# Patient Record
Sex: MALE | Race: WHITE | NOT HISPANIC OR LATINO | Employment: FULL TIME | URBAN - METROPOLITAN AREA
[De-identification: names, ages, dates, MRNs, and addresses within clinical notes are randomized per-mention and may not be internally consistent; named-entity substitution may affect disease eponyms.]

---

## 2023-02-17 PROBLEM — K21.9 GASTROESOPHAGEAL REFLUX DISEASE WITHOUT ESOPHAGITIS: Status: ACTIVE | Noted: 2023-02-17

## 2023-02-17 PROBLEM — J32.0 CHRONIC MAXILLARY SINUSITIS: Status: ACTIVE | Noted: 2023-02-17

## 2023-02-17 PROBLEM — J45.991 COUGH VARIANT ASTHMA (HHS-HCC): Status: ACTIVE | Noted: 2023-02-17

## 2023-02-17 PROBLEM — H02.401 PTOSIS OF RIGHT EYELID: Status: ACTIVE | Noted: 2023-02-17

## 2023-02-17 PROBLEM — R06.3 CHEYNE-STOKES BREATHING: Status: ACTIVE | Noted: 2023-02-17

## 2023-02-17 PROBLEM — G47.00 INSOMNIA: Status: ACTIVE | Noted: 2023-02-17

## 2023-02-17 PROBLEM — R05.2 SUBACUTE COUGH: Status: ACTIVE | Noted: 2023-02-17

## 2023-02-17 PROBLEM — M25.561 ACUTE PAIN OF RIGHT KNEE: Status: ACTIVE | Noted: 2023-02-17

## 2023-02-17 PROBLEM — R00.2 PALPITATIONS: Status: ACTIVE | Noted: 2023-02-17

## 2023-02-17 PROBLEM — J20.9 ACUTE BRONCHITIS, UNSPECIFIED: Status: ACTIVE | Noted: 2023-02-17

## 2023-02-17 PROBLEM — J32.1 CHRONIC FRONTAL SINUSITIS: Status: ACTIVE | Noted: 2023-02-17

## 2023-02-17 PROBLEM — E55.9 VITAMIN D DEFICIENCY: Status: ACTIVE | Noted: 2023-02-17

## 2023-02-17 PROBLEM — J31.0 CHRONIC RHINITIS: Status: ACTIVE | Noted: 2023-02-17

## 2023-02-17 PROBLEM — L25.5 CONTACT DERMATITIS DUE TO PLANT: Status: ACTIVE | Noted: 2023-02-17

## 2023-02-17 PROBLEM — R19.4 CHANGE IN BOWEL HABITS: Status: ACTIVE | Noted: 2023-02-17

## 2023-02-17 PROBLEM — L25.9 CONTACT DERMATITIS: Status: ACTIVE | Noted: 2023-02-17

## 2023-02-17 PROBLEM — L29.0 PRURITUS ANI: Status: ACTIVE | Noted: 2023-02-17

## 2023-02-17 PROBLEM — I72.9 ANEURYSM (CMS-HCC): Status: ACTIVE | Noted: 2023-02-17

## 2023-02-17 RX ORDER — ALBUTEROL SULFATE 90 UG/1
2 AEROSOL, METERED RESPIRATORY (INHALATION)
COMMUNITY
Start: 2019-04-09 | End: 2023-04-05 | Stop reason: ALTCHOICE

## 2023-02-17 RX ORDER — RABEPRAZOLE SODIUM 20 MG/1
20 TABLET, DELAYED RELEASE ORAL DAILY
COMMUNITY
Start: 2018-06-22 | End: 2023-09-05

## 2023-02-17 RX ORDER — FLUTICASONE PROPIONATE 44 UG/1
2 AEROSOL, METERED RESPIRATORY (INHALATION) 2 TIMES DAILY
COMMUNITY
Start: 2021-10-26 | End: 2023-04-05

## 2023-02-17 RX ORDER — AZELASTINE 1 MG/ML
2 SPRAY, METERED NASAL
COMMUNITY
Start: 2020-01-10 | End: 2023-05-18 | Stop reason: SDUPTHER

## 2023-02-17 RX ORDER — CHOLECALCIFEROL (VITAMIN D3) 25 MCG
25 TABLET ORAL DAILY
COMMUNITY
Start: 2015-07-07

## 2023-02-17 RX ORDER — MONTELUKAST SODIUM 10 MG/1
10 TABLET ORAL DAILY
COMMUNITY
Start: 2015-01-21 | End: 2024-02-06
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
## 2023-04-04 ASSESSMENT — PROMIS GLOBAL HEALTH SCALE
RATE_AVERAGE_PAIN: 1
RATE_SOCIAL_SATISFACTION: EXCELLENT
RATE_QUALITY_OF_LIFE: EXCELLENT
CARRYOUT_SOCIAL_ACTIVITIES: EXCELLENT
EMOTIONAL_PROBLEMS: NEVER
RATE_MENTAL_HEALTH: EXCELLENT
RATE_PHYSICAL_HEALTH: VERY GOOD
RATE_AVERAGE_FATIGUE: MILD
CARRYOUT_PHYSICAL_ACTIVITIES: COMPLETELY
RATE_GENERAL_HEALTH: VERY GOOD

## 2023-04-05 ENCOUNTER — OFFICE VISIT (OUTPATIENT)
Dept: PRIMARY CARE | Facility: CLINIC | Age: 63
End: 2023-04-05
Payer: COMMERCIAL

## 2023-04-05 ENCOUNTER — LAB (OUTPATIENT)
Dept: LAB | Facility: LAB | Age: 63
End: 2023-04-05
Payer: COMMERCIAL

## 2023-04-05 VITALS
WEIGHT: 220.38 LBS | SYSTOLIC BLOOD PRESSURE: 110 MMHG | BODY MASS INDEX: 31.85 KG/M2 | HEART RATE: 62 BPM | DIASTOLIC BLOOD PRESSURE: 80 MMHG

## 2023-04-05 DIAGNOSIS — K21.9 GASTROESOPHAGEAL REFLUX DISEASE WITHOUT ESOPHAGITIS: ICD-10-CM

## 2023-04-05 DIAGNOSIS — J31.0 CHRONIC RHINITIS: ICD-10-CM

## 2023-04-05 DIAGNOSIS — I72.9 ANEURYSM (CMS-HCC): ICD-10-CM

## 2023-04-05 DIAGNOSIS — L29.0 PRURITUS ANI: Primary | ICD-10-CM

## 2023-04-05 DIAGNOSIS — J45.991 COUGH VARIANT ASTHMA (HHS-HCC): ICD-10-CM

## 2023-04-05 DIAGNOSIS — E55.9 VITAMIN D DEFICIENCY: ICD-10-CM

## 2023-04-05 DIAGNOSIS — L29.0 PRURITUS ANI: ICD-10-CM

## 2023-04-05 LAB
ALANINE AMINOTRANSFERASE (SGPT) (U/L) IN SER/PLAS: 24 U/L (ref 10–52)
ALBUMIN (G/DL) IN SER/PLAS: 4.2 G/DL (ref 3.4–5)
ALKALINE PHOSPHATASE (U/L) IN SER/PLAS: 62 U/L (ref 33–136)
ANION GAP IN SER/PLAS: 13 MMOL/L (ref 10–20)
ASPARTATE AMINOTRANSFERASE (SGOT) (U/L) IN SER/PLAS: 30 U/L (ref 9–39)
BASOPHILS (10*3/UL) IN BLOOD BY AUTOMATED COUNT: 0.06 X10E9/L (ref 0–0.1)
BASOPHILS/100 LEUKOCYTES IN BLOOD BY AUTOMATED COUNT: 1.2 % (ref 0–2)
BILIRUBIN TOTAL (MG/DL) IN SER/PLAS: 0.9 MG/DL (ref 0–1.2)
C REACTIVE PROTEIN (MG/L) IN SER/PLAS BY HIGH SENSIT: 1.8 MG/L
CALCIDIOL (25 OH VITAMIN D3) (NG/ML) IN SER/PLAS: 32 NG/ML
CALCIUM (MG/DL) IN SER/PLAS: 9 MG/DL (ref 8.6–10.6)
CARBON DIOXIDE, TOTAL (MMOL/L) IN SER/PLAS: 27 MMOL/L (ref 21–32)
CHLORIDE (MMOL/L) IN SER/PLAS: 103 MMOL/L (ref 98–107)
CHOLESTEROL (MG/DL) IN SER/PLAS: 188 MG/DL (ref 0–199)
CHOLESTEROL IN HDL (MG/DL) IN SER/PLAS: 47.6 MG/DL
CHOLESTEROL/HDL RATIO: 3.9
COBALAMIN (VITAMIN B12) (PG/ML) IN SER/PLAS: 285 PG/ML (ref 211–911)
CREATININE (MG/DL) IN SER/PLAS: 1.01 MG/DL (ref 0.5–1.3)
EOSINOPHILS (10*3/UL) IN BLOOD BY AUTOMATED COUNT: 0.15 X10E9/L (ref 0–0.7)
EOSINOPHILS/100 LEUKOCYTES IN BLOOD BY AUTOMATED COUNT: 3 % (ref 0–6)
ERYTHROCYTE DISTRIBUTION WIDTH (RATIO) BY AUTOMATED COUNT: 12.1 % (ref 11.5–14.5)
ERYTHROCYTE MEAN CORPUSCULAR HEMOGLOBIN CONCENTRATION (G/DL) BY AUTOMATED: 34.3 G/DL (ref 32–36)
ERYTHROCYTE MEAN CORPUSCULAR VOLUME (FL) BY AUTOMATED COUNT: 93 FL (ref 80–100)
ERYTHROCYTES (10*6/UL) IN BLOOD BY AUTOMATED COUNT: 4.65 X10E12/L (ref 4.5–5.9)
GFR MALE: 84 ML/MIN/1.73M2
GLUCOSE (MG/DL) IN SER/PLAS: 89 MG/DL (ref 74–99)
HEMATOCRIT (%) IN BLOOD BY AUTOMATED COUNT: 43.4 % (ref 41–52)
HEMOGLOBIN (G/DL) IN BLOOD: 14.9 G/DL (ref 13.5–17.5)
IMMATURE GRANULOCYTES/100 LEUKOCYTES IN BLOOD BY AUTOMATED COUNT: 0.2 % (ref 0–0.9)
LDL: 122 MG/DL (ref 0–99)
LEUKOCYTES (10*3/UL) IN BLOOD BY AUTOMATED COUNT: 4.9 X10E9/L (ref 4.4–11.3)
LYMPHOCYTES (10*3/UL) IN BLOOD BY AUTOMATED COUNT: 1.31 X10E9/L (ref 1.2–4.8)
LYMPHOCYTES/100 LEUKOCYTES IN BLOOD BY AUTOMATED COUNT: 26.6 % (ref 13–44)
MONOCYTES (10*3/UL) IN BLOOD BY AUTOMATED COUNT: 0.54 X10E9/L (ref 0.1–1)
MONOCYTES/100 LEUKOCYTES IN BLOOD BY AUTOMATED COUNT: 11 % (ref 2–10)
NEUTROPHILS (10*3/UL) IN BLOOD BY AUTOMATED COUNT: 2.86 X10E9/L (ref 1.2–7.7)
NEUTROPHILS/100 LEUKOCYTES IN BLOOD BY AUTOMATED COUNT: 58 % (ref 40–80)
NRBC (PER 100 WBCS) BY AUTOMATED COUNT: 0 /100 WBC (ref 0–0)
PLATELETS (10*3/UL) IN BLOOD AUTOMATED COUNT: 247 X10E9/L (ref 150–450)
POTASSIUM (MMOL/L) IN SER/PLAS: 4.4 MMOL/L (ref 3.5–5.3)
PROSTATE SPECIFIC AG (NG/ML) IN SER/PLAS: 0.5 NG/ML (ref 0–4)
PROTEIN TOTAL: 6.3 G/DL (ref 6.4–8.2)
SODIUM (MMOL/L) IN SER/PLAS: 139 MMOL/L (ref 136–145)
THYROTROPIN (MIU/L) IN SER/PLAS BY DETECTION LIMIT <= 0.05 MIU/L: 2.82 MIU/L (ref 0.44–3.98)
TRIGLYCERIDE (MG/DL) IN SER/PLAS: 93 MG/DL (ref 0–149)
UREA NITROGEN (MG/DL) IN SER/PLAS: 15 MG/DL (ref 6–23)
VLDL: 19 MG/DL (ref 0–40)

## 2023-04-05 PROCEDURE — 36415 COLL VENOUS BLD VENIPUNCTURE: CPT

## 2023-04-05 PROCEDURE — 84153 ASSAY OF PSA TOTAL: CPT

## 2023-04-05 PROCEDURE — 86141 C-REACTIVE PROTEIN HS: CPT

## 2023-04-05 PROCEDURE — 84443 ASSAY THYROID STIM HORMONE: CPT

## 2023-04-05 PROCEDURE — 80053 COMPREHEN METABOLIC PANEL: CPT

## 2023-04-05 PROCEDURE — 82607 VITAMIN B-12: CPT

## 2023-04-05 PROCEDURE — 85025 COMPLETE CBC W/AUTO DIFF WBC: CPT

## 2023-04-05 PROCEDURE — 99213 OFFICE O/P EST LOW 20 MIN: CPT | Performed by: INTERNAL MEDICINE

## 2023-04-05 PROCEDURE — 1036F TOBACCO NON-USER: CPT | Performed by: INTERNAL MEDICINE

## 2023-04-05 PROCEDURE — 80061 LIPID PANEL: CPT

## 2023-04-05 PROCEDURE — 82306 VITAMIN D 25 HYDROXY: CPT

## 2023-04-05 NOTE — PATIENT INSTRUCTIONS
Begin Pulmicort 2 puffs daily.  Begin Benadryl 12.5-25 mg p.o. nightly.  Restart Astelin spray 2 sprays to each nostril twice daily as needed.  Call me in 1 month with her condition and return for complete physical examination in 1 year

## 2023-04-07 DIAGNOSIS — J45.991 COUGH VARIANT ASTHMA (HHS-HCC): Primary | ICD-10-CM

## 2023-05-18 DIAGNOSIS — J31.0 CHRONIC RHINITIS: Primary | ICD-10-CM

## 2023-05-18 RX ORDER — AZELASTINE 1 MG/ML
2 SPRAY, METERED NASAL 2 TIMES DAILY
Qty: 30 ML | Refills: 1 | Status: SHIPPED | OUTPATIENT
Start: 2023-05-18

## 2023-08-29 DIAGNOSIS — K21.9 GASTROESOPHAGEAL REFLUX DISEASE WITHOUT ESOPHAGITIS: Primary | ICD-10-CM

## 2023-09-05 RX ORDER — RABEPRAZOLE SODIUM 20 MG/1
20 TABLET, DELAYED RELEASE ORAL DAILY
Qty: 90 TABLET | Refills: 2 | Status: SHIPPED | OUTPATIENT
Start: 2023-09-05

## 2024-02-04 DIAGNOSIS — J45.991 COUGH VARIANT ASTHMA (HHS-HCC): Primary | ICD-10-CM

## 2024-02-06 RX ORDER — MONTELUKAST SODIUM 10 MG/1
10 TABLET ORAL DAILY
Qty: 90 TABLET | Refills: 3 | Status: SHIPPED | OUTPATIENT
Start: 2024-02-06

## 2024-06-16 NOTE — PROGRESS NOTES
Subjective   Patient ID: Ashok Olivera is a 63 y.o. male who presents for cough    HPI   The patient reports continued chronic nonproductive cough which she feels is increased in frequency over the past year.  He does report that the episodes can be precipitated by exercise, exposure to cut grass.  He also reports that the cough can be precipitated by the patient initially lying down, but he also reports that the cough will develop upon rising in the morning.  Over the past year, he still reports continued chronic frequent throat clearing, chronic constant sensation of something being in the back of the throat-not significantly changed.  He reports no other associated symptoms.  The patient has been using rabeprazole 20 mg half hour before dinner and montelukast.  He has not regularly been using Astelin as he did not feel that using Astelin had any effect on the frequency of the chronic cough  Review of Systems    Objective   There were no vitals taken for this visit.    Physical Exam  Head-palpation revealed a non-movable, nontender 5x5mm nodule right occipital region, no increase in warmth.  Palpation revealed no tenderness over the maxillary or frontal sinuses.    Nose - turbinates not erythematous or swollen; nasal septal deviation noted to the left.  Mouth - posterior pharynx is not erythematous or swollen.. Tonsillar pillars appeared normal no exudates.  Neck - no lymphadenopathy. Thyroid gland not enlarged. No bruits.  Lungs - clear to auscultation bilaterally.  Cardiac - rate normal rhythm regular no murmurs no JVD.  Abdomen - soft nondistended, normal active bowel sounds  Palpation revealed no tenderness or masses.  Assessment/Plan        Assessment  Chronic nonproductive cough-May be secondary to gastroesophageal reflux, upper airway cough syndrome secondary to allergic rhinitis cough variant asthma may be a contributing factor  Plan  Obtain chest x-ray today.  Increase rabeprazole dosage to 20 mg p.o.  twice daily  Restart Astelin spray 2 sprays to each nostril twice daily rather than 1 spray to each nostril twice daily and restart Nasacort nasal spray 1 spray to each nostril twice daily.  The patient will return for a follow-up visit in 3 to 4 weeks

## 2024-06-17 ENCOUNTER — HOSPITAL ENCOUNTER (OUTPATIENT)
Dept: RADIOLOGY | Facility: CLINIC | Age: 64
Discharge: HOME | End: 2024-06-17
Payer: COMMERCIAL

## 2024-06-17 ENCOUNTER — APPOINTMENT (OUTPATIENT)
Dept: PRIMARY CARE | Facility: CLINIC | Age: 64
End: 2024-06-17
Payer: COMMERCIAL

## 2024-06-17 VITALS
WEIGHT: 221.8 LBS | SYSTOLIC BLOOD PRESSURE: 110 MMHG | HEART RATE: 84 BPM | BODY MASS INDEX: 32.05 KG/M2 | DIASTOLIC BLOOD PRESSURE: 80 MMHG

## 2024-06-17 DIAGNOSIS — J45.991 COUGH VARIANT ASTHMA (HHS-HCC): ICD-10-CM

## 2024-06-17 DIAGNOSIS — K21.9 GASTROESOPHAGEAL REFLUX DISEASE WITHOUT ESOPHAGITIS: ICD-10-CM

## 2024-06-17 DIAGNOSIS — J31.0 CHRONIC RHINITIS: Primary | ICD-10-CM

## 2024-06-17 DIAGNOSIS — J31.0 CHRONIC RHINITIS: ICD-10-CM

## 2024-06-17 PROCEDURE — 99212 OFFICE O/P EST SF 10 MIN: CPT | Performed by: INTERNAL MEDICINE

## 2024-06-17 PROCEDURE — 71046 X-RAY EXAM CHEST 2 VIEWS: CPT

## 2024-06-17 PROCEDURE — 71046 X-RAY EXAM CHEST 2 VIEWS: CPT | Performed by: STUDENT IN AN ORGANIZED HEALTH CARE EDUCATION/TRAINING PROGRAM

## 2024-06-24 ENCOUNTER — TELEPHONE (OUTPATIENT)
Dept: PRIMARY CARE | Facility: CLINIC | Age: 64
End: 2024-06-24
Payer: COMMERCIAL

## 2024-06-24 DIAGNOSIS — J31.0 CHRONIC RHINITIS: Primary | ICD-10-CM

## 2024-06-24 RX ORDER — AZELASTINE 1 MG/ML
2 SPRAY, METERED NASAL 2 TIMES DAILY
Qty: 30 ML | Refills: 1 | Status: SHIPPED | OUTPATIENT
Start: 2024-06-24

## 2024-08-04 DIAGNOSIS — K21.9 GASTROESOPHAGEAL REFLUX DISEASE WITHOUT ESOPHAGITIS: ICD-10-CM

## 2024-08-05 RX ORDER — RABEPRAZOLE SODIUM 20 MG/1
20 TABLET, DELAYED RELEASE ORAL DAILY
Qty: 90 TABLET | Refills: 3 | Status: SHIPPED | OUTPATIENT
Start: 2024-08-05

## 2024-09-17 DIAGNOSIS — J31.0 CHRONIC RHINITIS: ICD-10-CM

## 2024-09-18 RX ORDER — AZELASTINE 1 MG/ML
2 SPRAY, METERED NASAL 2 TIMES DAILY
Qty: 30 ML | Refills: 3 | Status: SHIPPED | OUTPATIENT
Start: 2024-09-18

## 2025-02-07 DIAGNOSIS — Z12.11 COLON CANCER SCREENING: ICD-10-CM

## 2025-02-07 RX ORDER — SODIUM, POTASSIUM,MAG SULFATES 17.5-3.13G
SOLUTION, RECONSTITUTED, ORAL ORAL
Qty: 1 EACH | Refills: 0 | Status: SHIPPED | OUTPATIENT
Start: 2025-02-07

## 2025-03-25 ENCOUNTER — LAB REQUISITION (OUTPATIENT)
Dept: LAB | Facility: HOSPITAL | Age: 65
End: 2025-03-25
Payer: COMMERCIAL

## 2025-03-25 ENCOUNTER — APPOINTMENT (OUTPATIENT)
Dept: GASTROENTEROLOGY | Facility: EXTERNAL LOCATION | Age: 65
End: 2025-03-25
Payer: COMMERCIAL

## 2025-03-25 DIAGNOSIS — Z12.11 SCREEN FOR COLON CANCER: ICD-10-CM

## 2025-03-25 DIAGNOSIS — Z86.0101 HISTORY OF ADENOMATOUS POLYP OF COLON: ICD-10-CM

## 2025-03-25 DIAGNOSIS — Z12.11 ENCOUNTER FOR SCREENING FOR MALIGNANT NEOPLASM OF COLON: ICD-10-CM

## 2025-03-25 DIAGNOSIS — Z12.11 SCREEN FOR COLON CANCER: Primary | ICD-10-CM

## 2025-03-25 DIAGNOSIS — D12.3 BENIGN NEOPLASM OF TRANSVERSE COLON: ICD-10-CM

## 2025-03-25 PROCEDURE — 88305 TISSUE EXAM BY PATHOLOGIST: CPT

## 2025-03-25 PROCEDURE — 88305 TISSUE EXAM BY PATHOLOGIST: CPT | Performed by: STUDENT IN AN ORGANIZED HEALTH CARE EDUCATION/TRAINING PROGRAM

## 2025-03-25 PROCEDURE — 45385 COLONOSCOPY W/LESION REMOVAL: CPT | Performed by: INTERNAL MEDICINE

## 2025-04-03 LAB
LABORATORY COMMENT REPORT: NORMAL
PATH REPORT.FINAL DX SPEC: NORMAL
PATH REPORT.GROSS SPEC: NORMAL
PATH REPORT.RELEVANT HX SPEC: NORMAL
PATH REPORT.TOTAL CANCER: NORMAL

## 2025-04-17 ASSESSMENT — PROMIS GLOBAL HEALTH SCALE
RATE_GENERAL_HEALTH: VERY GOOD
CARRYOUT_SOCIAL_ACTIVITIES: EXCELLENT
RATE_AVERAGE_FATIGUE: MILD
RATE_PHYSICAL_HEALTH: VERY GOOD
RATE_QUALITY_OF_LIFE: VERY GOOD
CARRYOUT_PHYSICAL_ACTIVITIES: COMPLETELY
EMOTIONAL_PROBLEMS: NEVER
RATE_AVERAGE_PAIN: 0
RATE_MENTAL_HEALTH: EXCELLENT
RATE_SOCIAL_SATISFACTION: EXCELLENT

## 2025-04-19 NOTE — PROGRESS NOTES
Subjective   Patient ID: Ashok Olivera is a 64 y.o. male who presents for CPE    HPI   Over the past year, the patient reports continued chronic nonproductive cough, chronic frequent throat clearing, chronic constant sensation of something being in the throat, chronic rhinorrhea, chronic postnasal drainage-unchanged..  Over the past year, he still reports continued chronic intermittent episodes of nonproductive cough occurring with exercise but unchanged.  He reports no other associated symptoms.  Patient has not been using Nasacort nasal spray.    Over the past 6 months, he reports a history of intermittent episodes of itching after defecation.  He reports that the itching resolves immediately after wiping.  He reports no other associated symptoms.    Over the past few years, the patient reports no episodes of soreness in the right knee and no episodes of soreness and stiffness in the lower back region.    Over the past 1 to 2 years, the patient reports that he has been somewhat better able to fall back asleep.  No other new complaints.  Review of Systems  All systems have been reviewed and are normal except as previously noted  Objective   There were no vitals taken for this visit.    Physical Exam  Head-palpation revealed a non-movable, nontender 5x5mm nodule right occipital region, no increase in warmth.  Palpation revealed no tenderness over the maxillary or frontal sinuses.  Eyes - extraocular movements intact ;pupils equal and reactive to light; fundi not well visualized.  Ears - palpation of pinnas and traguses revealed no tenderness. External auditory canals not erythematous or swollen. TMs not visualized secondary to impacted cerumen  Nose - turbinates not erythematous or swollen; nasal septal deviation noted to the right.  Mouth - posterior pharynx is thematous but not swollen . Tonsillar pillars appeared normal no exudates.  Neck - no lymphadenopathy. Thyroid gland not enlarged. No bruits.  Lungs -  clear to auscultation bilaterally.  Cardiac - rate normal rhythm regular no murmurs no JVD.  Abdomen - soft nondistended, normal active bowel sounds  Palpation revealed no tenderness or masses.  Rectal-deferred-colonoscopy March 25, 2025  Pulses - 2+ bilaterally in the upper extremities, 2+ in the lower extremities bilaterally except PT and DP pulses in the left foot are 0.   Extremities - no peripheral edema.  Skin - blackish/green discoloration of the right first, and fifth toenails noted as well as the first, third, fourth and fifth toenails of the left foot noted. Multiple nevi noted on the chest and back.    Neurologic  Mental status-alert and oriented x3   Cranial nerves-2 through 12 grossly intact, no visual field abnormalities  Motor-no pronator drift noted, strength-5/5 in all muscle groups tested, , no tremor noted.  No bradykinesia noted.  No rigidity noted.  Negative pull test  Sensory-Light touch sensation fully intact  Pinprick sensation fully intact  Vibratory sensation fully intact  Cerebellar-no truncal ataxia, good coordination finger-nose testing,, good coordination heel-to-shin testing, normal rapid alternating movements  Romberg negative, no abnormality in tandem gait  Reflexes-1+/4 bilaterally  Assessment/Plan         Assessment  Cough variant asthma  COVID-19 2023  Chronic postnasal drainage occurring at rest and increased when reclining-probably secondary to upper airway cough syndrome secondary to allergic rhinitis, structural abnormality.  Chronic nonproductive cough, chronic frequent throat clearing, chronic rhinorrhea chronic constant sensation of something being in the throat? Secondary to upper airway cough syndrome secondary to allergic rhinitis, structural abnormality.  Cough variant asthma is a possible contributing factor.. Gastroesophageal reflux is a possible etiology as well.  Chronic intermittent episodes of nonproductive cough occurring with exercise-may be secondary to cough  variant asthma  Chronic bilateral maxillary sinusitis May 2019  Allergic rhinitis  Colonic polyps-tubular adenoma March 25, 2025  Diverticulosis  Intermittent brief episodes of itching occurring after defecation-likely secondary to pruritus ani  Pruritus ani  IBS    Osteoarthritis of the right knee-mild  Vitamin D deficiency.  Onychomycosis of the toenails  Multiple nevi noted diffusely  Left ophthalmic segment ICA aneurysm status post embolization August 2019  Osteoarthritis and degenerative disc disease of the lumbosacral spine.  Chronic insomnia - may be secondary to primary insomnia, anxiety     Plan  Obtain EKG, urinalysis today.  Obtain CBC differential, CMP, fasting lipid profile, TSH, vitamin D level, vitamin B12 level, cardiac CRP today  Refer to ENT ASAP.  For now, I told the patient that he should receive 2 doses of Shingrix and 1 dose of Prevnar 20 within the next 6 months.    I recommended that he restart Nasacort nasal spray 1 spray each nostril twice daily.  The patient will continue all of his other current medications pending the results of lab work.  Patient should return for complete physical examination in 1 year

## 2025-04-21 ENCOUNTER — APPOINTMENT (OUTPATIENT)
Dept: PRIMARY CARE | Facility: CLINIC | Age: 65
End: 2025-04-21
Payer: COMMERCIAL

## 2025-04-21 VITALS
HEIGHT: 70 IN | BODY MASS INDEX: 31.75 KG/M2 | HEART RATE: 84 BPM | SYSTOLIC BLOOD PRESSURE: 120 MMHG | WEIGHT: 221.8 LBS | TEMPERATURE: 98 F | DIASTOLIC BLOOD PRESSURE: 80 MMHG

## 2025-04-21 DIAGNOSIS — K21.9 GASTROESOPHAGEAL REFLUX DISEASE WITHOUT ESOPHAGITIS: ICD-10-CM

## 2025-04-21 DIAGNOSIS — Z00.00 ROUTINE GENERAL MEDICAL EXAMINATION AT A HEALTH CARE FACILITY: Primary | ICD-10-CM

## 2025-04-21 DIAGNOSIS — J45.991 COUGH VARIANT ASTHMA (HHS-HCC): ICD-10-CM

## 2025-04-21 DIAGNOSIS — J31.0 CHRONIC RHINITIS: ICD-10-CM

## 2025-04-21 LAB
POC APPEARANCE, URINE: ABNORMAL
POC BILIRUBIN, URINE: NEGATIVE
POC BLOOD, URINE: ABNORMAL
POC COLOR, URINE: YELLOW
POC GLUCOSE, URINE: NEGATIVE MG/DL
POC KETONES, URINE: NEGATIVE MG/DL
POC LEUKOCYTES, URINE: NEGATIVE
POC NITRITE,URINE: NEGATIVE
POC PH, URINE: 6 PH
POC PROTEIN, URINE: NEGATIVE MG/DL
POC SPECIFIC GRAVITY, URINE: 1.02
POC UROBILINOGEN, URINE: 0.2 EU/DL

## 2025-04-21 PROCEDURE — 81002 URINALYSIS NONAUTO W/O SCOPE: CPT | Performed by: INTERNAL MEDICINE

## 2025-04-21 PROCEDURE — 99396 PREV VISIT EST AGE 40-64: CPT | Performed by: INTERNAL MEDICINE

## 2025-04-21 PROCEDURE — 1036F TOBACCO NON-USER: CPT | Performed by: INTERNAL MEDICINE

## 2025-04-21 PROCEDURE — 93000 ELECTROCARDIOGRAM COMPLETE: CPT | Performed by: INTERNAL MEDICINE

## 2025-04-21 PROCEDURE — 3008F BODY MASS INDEX DOCD: CPT | Performed by: INTERNAL MEDICINE

## 2025-04-21 PROCEDURE — 99213 OFFICE O/P EST LOW 20 MIN: CPT | Performed by: INTERNAL MEDICINE

## 2025-04-21 RX ORDER — BUTYROSPERMUM PARKII(SHEA BUTTER), SIMMONDSIA CHINENSIS (JOJOBA) SEED OIL, ALOE BARBADENSIS LEAF EXTRACT .01; 1; 3.5 G/100G; G/100G; G/100G
250 LIQUID TOPICAL 2 TIMES DAILY
COMMUNITY
End: 2025-04-21 | Stop reason: WASHOUT

## 2025-04-21 RX ORDER — CHOLECALCIFEROL (VITAMIN D3) 25 MCG
50 TABLET ORAL DAILY
Qty: 90 TABLET | Refills: 2 | Status: SHIPPED | OUTPATIENT
Start: 2025-04-21

## 2025-04-21 RX ORDER — RABEPRAZOLE SODIUM 20 MG/1
20 TABLET, DELAYED RELEASE ORAL 2 TIMES DAILY
Qty: 90 TABLET | Refills: 3 | Status: SHIPPED | OUTPATIENT
Start: 2025-04-21

## 2025-04-22 LAB
25(OH)D3+25(OH)D2 SERPL-MCNC: 34 NG/ML (ref 30–100)
ALBUMIN SERPL-MCNC: 4.5 G/DL (ref 3.6–5.1)
ALP SERPL-CCNC: 58 U/L (ref 35–144)
ALT SERPL-CCNC: 14 U/L (ref 9–46)
ANION GAP SERPL CALCULATED.4IONS-SCNC: 6 MMOL/L (CALC) (ref 7–17)
AST SERPL-CCNC: 21 U/L (ref 10–35)
BASOPHILS # BLD AUTO: 37 CELLS/UL (ref 0–200)
BASOPHILS NFR BLD AUTO: 0.7 %
BILIRUB SERPL-MCNC: 0.9 MG/DL (ref 0.2–1.2)
BUN SERPL-MCNC: 19 MG/DL (ref 7–25)
CALCIUM SERPL-MCNC: 9.3 MG/DL (ref 8.6–10.3)
CHLORIDE SERPL-SCNC: 103 MMOL/L (ref 98–110)
CHOLEST SERPL-MCNC: 197 MG/DL
CHOLEST/HDLC SERPL: 3.6 (CALC)
CO2 SERPL-SCNC: 30 MMOL/L (ref 20–32)
CREAT SERPL-MCNC: 1.09 MG/DL (ref 0.7–1.35)
CRP SERPL HS-MCNC: 1.3 MG/L
EGFRCR SERPLBLD CKD-EPI 2021: 76 ML/MIN/1.73M2
EOSINOPHIL # BLD AUTO: 159 CELLS/UL (ref 15–500)
EOSINOPHIL NFR BLD AUTO: 3 %
ERYTHROCYTE [DISTWIDTH] IN BLOOD BY AUTOMATED COUNT: 13 % (ref 11–15)
GLUCOSE SERPL-MCNC: 97 MG/DL (ref 65–99)
HCT VFR BLD AUTO: 46.5 % (ref 38.5–50)
HDLC SERPL-MCNC: 55 MG/DL
HGB BLD-MCNC: 15.7 G/DL (ref 13.2–17.1)
HIV 1+2 AB+HIV1 P24 AG SERPL QL IA: NORMAL
LDLC SERPL CALC-MCNC: 123 MG/DL (CALC)
LYMPHOCYTES # BLD AUTO: 1251 CELLS/UL (ref 850–3900)
LYMPHOCYTES NFR BLD AUTO: 23.6 %
MCH RBC QN AUTO: 32.1 PG (ref 27–33)
MCHC RBC AUTO-ENTMCNC: 33.8 G/DL (ref 32–36)
MCV RBC AUTO: 95.1 FL (ref 80–100)
MONOCYTES # BLD AUTO: 546 CELLS/UL (ref 200–950)
MONOCYTES NFR BLD AUTO: 10.3 %
NEUTROPHILS # BLD AUTO: 3307 CELLS/UL (ref 1500–7800)
NEUTROPHILS NFR BLD AUTO: 62.4 %
NONHDLC SERPL-MCNC: 142 MG/DL (CALC)
PLATELET # BLD AUTO: 273 THOUSAND/UL (ref 140–400)
PMV BLD REES-ECKER: 9.8 FL (ref 7.5–12.5)
POTASSIUM SERPL-SCNC: 4.2 MMOL/L (ref 3.5–5.3)
PROT SERPL-MCNC: 6.7 G/DL (ref 6.1–8.1)
PSA SERPL-MCNC: 0.62 NG/ML
RBC # BLD AUTO: 4.89 MILLION/UL (ref 4.2–5.8)
SODIUM SERPL-SCNC: 139 MMOL/L (ref 135–146)
TRIGL SERPL-MCNC: 88 MG/DL
TSH SERPL-ACNC: 2.87 MIU/L (ref 0.4–4.5)
VIT B12 SERPL-MCNC: 289 PG/ML (ref 200–1100)
WBC # BLD AUTO: 5.3 THOUSAND/UL (ref 3.8–10.8)

## 2025-05-12 ENCOUNTER — APPOINTMENT (OUTPATIENT)
Dept: OTOLARYNGOLOGY | Facility: CLINIC | Age: 65
End: 2025-05-12
Payer: COMMERCIAL

## 2025-05-12 VITALS — BODY MASS INDEX: 31.57 KG/M2 | WEIGHT: 220 LBS

## 2025-05-12 DIAGNOSIS — J30.0 VASOMOTOR RHINITIS: ICD-10-CM

## 2025-05-12 DIAGNOSIS — J32.9 CHRONIC SINUSITIS, UNSPECIFIED LOCATION: ICD-10-CM

## 2025-05-12 DIAGNOSIS — J34.2 DEVIATED SEPTUM: ICD-10-CM

## 2025-05-12 DIAGNOSIS — K21.9 LARYNGOPHARYNGEAL REFLUX (LPR): ICD-10-CM

## 2025-05-12 DIAGNOSIS — J45.991 COUGH VARIANT ASTHMA (HHS-HCC): ICD-10-CM

## 2025-05-12 DIAGNOSIS — R09.89 THROAT CLEARING: Primary | ICD-10-CM

## 2025-05-12 DIAGNOSIS — R05.3 HABIT COUGH: ICD-10-CM

## 2025-05-12 PROCEDURE — 99204 OFFICE O/P NEW MOD 45 MIN: CPT | Performed by: GENERAL PRACTICE

## 2025-05-12 PROCEDURE — 31575 DIAGNOSTIC LARYNGOSCOPY: CPT | Performed by: GENERAL PRACTICE

## 2025-05-12 RX ORDER — FAMOTIDINE 40 MG/1
40 TABLET, FILM COATED ORAL
Qty: 60 TABLET | Refills: 3 | Status: SHIPPED | OUTPATIENT
Start: 2025-05-12

## 2025-05-12 RX ORDER — TRIAMCINOLONE ACETONIDE 55 UG/1
2 SPRAY, METERED NASAL DAILY
COMMUNITY

## 2025-05-12 RX ORDER — IPRATROPIUM BROMIDE 21 UG/1
2 SPRAY, METERED NASAL EVERY 12 HOURS
Qty: 30 ML | Refills: 3 | Status: SHIPPED | OUTPATIENT
Start: 2025-05-12 | End: 2026-05-12

## 2025-05-12 ASSESSMENT — PATIENT HEALTH QUESTIONNAIRE - PHQ9
SUM OF ALL RESPONSES TO PHQ9 QUESTIONS 1 AND 2: 0
2. FEELING DOWN, DEPRESSED OR HOPELESS: NOT AT ALL
1. LITTLE INTEREST OR PLEASURE IN DOING THINGS: NOT AT ALL

## 2025-05-12 NOTE — PROGRESS NOTES
Otolaryngology - Head and Neck Surgery Outpatient New Patient Visit Note        Assessment/Plan:   Problem List Items Addressed This Visit           ICD-10-CM       Pulmonary and Pneumonias    Cough variant asthma ([facility]) J45.991     Other Visit Diagnoses         Codes      Throat clearing    -  Primary R09.89      Chronic sinusitis, unspecified location     J32.9    Relevant Orders    CT sinus wo IV contrast      Laryngopharyngeal reflux (LPR)     K21.9    Relevant Medications    famotidine (Pepcid) 40 mg tablet      Vasomotor rhinitis     J30.0    Relevant Medications    ipratropium (Atrovent) 21 mcg (0.03 %) nasal spray      Habit cough     R05.3    Relevant Orders    Referral to Speech Therapy      Deviated septum     J34.2            64yoM with chronic cough, PND and throat clearing.    LPR changes on NP scope, discussed controls or reflux.  Discussed conservative management of reflux, and risks of long term anti-reflux medications.  Discussed avoidance of triggers, dietary and behavioral management strategies for reflux.  Discussed possible referral to GI for further testing and evaluation.  Will trial maximal medical therapy (combination PPI and H2 blockers) for 1-2 months and assess for symptom response.  Plan for taper of medical management to least possible to control symptoms, in favor of using dietary/behavioral controls.       Given longstanding history of throat clearing, will have pt see SLP to discuss habit cough.    Chronic rhinitis and vasomotor rhinitis contributing to PND.   Discussed controls for rhinitis with nasacort, astelin and PRN atrovent.     Some waxing/waning sinus pressure/discomfort and persistent drainage despite use of topical steroids, will acquire CT sinus to help rule out underlying sinusitis.   Deviated septum, consider future correction if bothersome.           Follow up:  -plan for follow up in clinic as needed, after completion of ordered studies, and in 1-2 months    All  of the above findings, impressions, treatment planning and follow up plans were discussed with the patient who indicated understanding.  the patient was instructed to contact or return to clinic sooner if symptoms/signs persist or worsen despite the above management.      David Lemus MD  Otolaryngology - Head and Neck Surgery            History Of Present Illness  Ashok Olivera is a 64 y.o. male presenting for chronic cough and throat clearing.    Has been symptomatic for 10-20y and reports making '3-4 different throat clearing noises' in attempts to clear PND, globus ad pharyngeal mucous.     Reports no sore throat, odynophagia.     Notes bothersome chronic congestion and waxing/waning rhinorrhea and PND.   Notes max/midface pressure  Infrequent acute sinusitis.  Notes allergic rhinitis controlled   with PO antihistamines with limited effect.  Has used topical steroid spray for >3mo without resolution.   Notes bothersome runny nose associated with temperature changes.    Notes prior nasal trauma and known deviated septum.    Reports a history of GERD, takes rabeprazole with some effect.    Intermittent breakthrough at night.                   Past Medical History  He has a past medical history of Cough variant asthma ([facility]) (01/14/2022) and Personal history of other diseases of the respiratory system (01/14/2022).    Surgical History  He has a past surgical history that includes MR angio head wo IV contrast (6/12/2019).     Social History  He reports that he has never smoked. He has never used smokeless tobacco. He reports current alcohol use of about 2.0 standard drinks of alcohol per week. He reports that he does not use drugs.    Family History  Family History[1]     Allergies  Prednisone    Review of Systems  ROS: Pertinent positives as noted in HPI.    - CONSTITUTIONAL: Does not report weight loss, fever or chills.    - HEENT:   Ear: Does not report tinnitus, vertigo, hearing loss, otalgia,  otorrhea  Nose: Does not report  ,  , epistaxis, decreased smell  Throat: Does not report pain, dysphagia, odynophagia  Larynx: Does not report hoarseness,  difficulty breathing, pain with speaking (odynophonia)  Neck: Does not report new masses, pain, swelling  Face: Does not report sinus pain, pressure, swelling, numbness, weakness     - RESPIRATORY: Does not report SOB or  .    - CV: Does not report palpitations or chest pain.     - GI: Does not report abdominal pain, nausea, vomiting or diarrhea.    - : Does not report dysuria or urinary frequency.    - MSK: Does not report myalgia or joint pain.    - SKIN: Does not report rash or pruritus.    - NEUROLOGICAL: Does not report headache or syncope.    - PSYCHIATRIC: Does not report recent changes in mood. Does not report anxiety or depression.         Physical Exam:     GENERAL:   Alert & Oriented to person, place and time; Normal affect and appearance. Well developed and well nourished. Conversant & cooperative with examination.     HEAD:   Normocephalic, atraumatic. No sinus tenderness to palpation. Normal parotid bilaterally. Normal facial strength.     NEUROLOGIC:   Cranial nerves II-XII grossly intact, gait WNL. Normal mood and affect.    EYES:   Extraocular movements intact. Pupils equal, round, reactive to light and accommodation. No nystagmus, no ptosis. no scleral injection.    EAR:   Normal auricle. No discomfort or TTP with manipulation.   Handheld otoscopic exam showed normal external auditory canals bilaterally. No purulence or EAC inflammation. Minimal cerumen.   Right tympanic membrane clear and mobile without evidence of perforation, retraction or middle ear effusion.   Left tympanic membrane clear and mobile without evidence of perforation, retraction or middle ear effusion.     NOSE:   No external deformity. No external nasal lesions, lacerations, or scars. Nasal tip symmetrical with normal nasal valves.   Nasal cavity with     anterior  rightward and posterior leftward deviated  septum, edematous  mucosa and turbinates. No lesions, masses, purulence or polyps.     OC/OP:   Mucous membranes moist, no masses, lesions or exudates.   Normal tongue, floor of mouth, teeth, gums, lips. Normal posterior pharyngeal wall.    Normal tonsils without erythema, exudate or obvious calculi     NECK:   No neck masses or thyroid enlargement. Trachea midline. No tenderness to palpation    LYMPHATIC:   No cervical lymphadenopathy.     RESPIRATORY:   Symmetric chest elevation & no retractions. No significant hoarseness. No increased work of breathing.    CV:   No clubbing or cyanosis. No obvious edema    Skin:   No facial rashes, vesicles or lesions.     Extremities:   No gross abnormalities      Clinic Procedure    Nasal Endoscopy Laryngoscopy Nasophrayngosocopy   Procedure: flexible fiberoptic laryngoscopy, nasopharyngoscopy.   Flexible Fiberoptic Laryngoscopy Indication:   inability to tolerate mirror exam     Risks, benefits, and alternatives, infection risk, bleeding risk and risk of mucosal trauma and pain were discussed with the patient. Verbal consent was obtained prior to the procedure and is detailed in the patient's record.     Procedure Note:      With the patient seated in the exam chair, the bilateral nasal cavity was topically treated with 4% lidocaine and phenylephrine.  The bilateral nasal cavity was intubated with the flexible laryngoscope.   Nasal Endoscopy: Nasal endoscopy was successfully completed using the endoscope and the nasal mucosa, septum, turbinates, and osteomeatal complex were examined.  Nasopharyngoscopy: Nasopharyngoscopy was successfully completed using the endoscope and the nasal mucosa, septum, turbinates, osteomeatal complex, and nasopharynx were examined.   Laryngoscopy: Laryngoscopy was successfully completed using the flexible laryngoscope and the nasopharynx, hypopharynx, and larynx were examined.  Patient Status: the patient  tolerated the procedure well.   Complications: there were no complications.      . After obtaining adequate decongestion and anesthesia, the scope was introduced into the floor of the nasal cavity. The septum, inferior, and middle turbinates were without any mucosal lesions.  The Fossa of [place] were clear bilaterally, and good velopharyngeal closure was obtained on phonation.  Base of tongue, tonsillar complex, and posterior pharyngeal wall free of asymmetry or concerning ulcerations or masses.  supraglottic segments with edema, pachydermia and erythema at the esophageal opening and posterior supraglottis.  scattered mucous debris.  No mucosal ulcerations or masses.  True vocal cords abduct and adduct normally with no mucosal or mass lesions.  No masses visualized in the pyriform recesses.  The scope was removed and patient tolerated the procedure well.      Information review:  External sources (notes, imaging, lab results) listed below personally reviewed to aid in medical decision making process.  -PCM 4/21/25  -GI 3/25/25  -PCM 6/17/25         [1]   Family History  Problem Relation Name Age of Onset    Atrial fibrillation Mother MACKENZIELONA PRICEHEMANT     Other (Myocardial Infarction) Mother MACKENZIELONA PRICEHEMANT     Arthritis Mother MACKENZIE ALBERTHEMANT     Cancer Mother MACKENZIE ALBERTHEMANT     Heart disease Mother MACKENZIE ALBERTHEMANT     Hypertension Mother MACKENZIE ALBERTHEMANT

## 2025-05-13 DIAGNOSIS — J45.991 COUGH VARIANT ASTHMA (HHS-HCC): ICD-10-CM

## 2025-05-13 RX ORDER — MONTELUKAST SODIUM 10 MG/1
10 TABLET ORAL DAILY
Qty: 90 TABLET | Refills: 3 | Status: SHIPPED | OUTPATIENT
Start: 2025-05-13

## 2025-05-19 ENCOUNTER — TELEPHONE (OUTPATIENT)
Dept: OTOLARYNGOLOGY | Facility: CLINIC | Age: 65
End: 2025-05-19
Payer: COMMERCIAL

## 2025-05-20 ENCOUNTER — HOSPITAL ENCOUNTER (OUTPATIENT)
Dept: RADIOLOGY | Facility: CLINIC | Age: 65
End: 2025-05-20
Payer: COMMERCIAL

## 2025-05-28 ENCOUNTER — HOSPITAL ENCOUNTER (OUTPATIENT)
Dept: RADIOLOGY | Facility: EXTERNAL LOCATION | Age: 65
Discharge: HOME | End: 2025-05-28

## 2025-06-23 ENCOUNTER — APPOINTMENT (OUTPATIENT)
Dept: OTOLARYNGOLOGY | Facility: CLINIC | Age: 65
End: 2025-06-23
Payer: COMMERCIAL

## 2025-06-23 VITALS — BODY MASS INDEX: 31.57 KG/M2 | WEIGHT: 220 LBS

## 2025-06-23 DIAGNOSIS — R05.3 HABIT COUGH: ICD-10-CM

## 2025-06-23 DIAGNOSIS — K21.9 LARYNGOPHARYNGEAL REFLUX (LPR): ICD-10-CM

## 2025-06-23 PROCEDURE — 1036F TOBACCO NON-USER: CPT | Performed by: GENERAL PRACTICE

## 2025-06-23 PROCEDURE — 99214 OFFICE O/P EST MOD 30 MIN: CPT | Performed by: GENERAL PRACTICE

## 2025-06-23 RX ORDER — MAGNESIUM CARB/ALUMINUM HYDROX 105-160MG
1 TABLET,CHEWABLE ORAL 3 TIMES DAILY
Qty: 90 TABLET | Refills: 3 | Status: SHIPPED | OUTPATIENT
Start: 2025-06-23

## 2025-06-23 ASSESSMENT — PATIENT HEALTH QUESTIONNAIRE - PHQ9
1. LITTLE INTEREST OR PLEASURE IN DOING THINGS: NOT AT ALL
2. FEELING DOWN, DEPRESSED OR HOPELESS: NOT AT ALL
SUM OF ALL RESPONSES TO PHQ9 QUESTIONS 1 AND 2: 0

## 2025-06-23 NOTE — PROGRESS NOTES
Otolaryngology - Head and Neck Surgery Outpatient Established Patient Visit Note        Assessment/Plan:       No change in chronic cough and throat clearing.  No obvious allergic rhinitis/sinusitis, discussed systematic cessation of astelin and montelukast and observation for symptoms.   Consider referral for allergy testing if symptoms recur  Discussed transition to gaviscon and observation for effect.  Consider referral to GI if ongoing symptoms despite medical management.    Referral to speech pathology to discuss habit cough.    Nasal obstruction   associated with external nasal deformity as much as anything else.  Pt reports obstruction is minimally bothersome.  Discussed possible functional rhinoplasty and pt is not interested        Follow up:  -plan for follow up in clinic as needed    All of the above findings, impressions, treatment planning and follow up plans were discussed with the patient who indicated understanding.  the patient was instructed to contact or return to clinic sooner if symptoms/signs persist or worsen despite the above management.      David Lemus MD  Otolaryngology - Head and Neck Surgery            History Of Present Illness  Ashok Olivera is a 64 y.o. male presenting for follow up of CT sinus.   Notes no change in symptoms with trials of atrovent or reflux.   Has not continued on atrovent, continues on astelin/singulair but with uncertain effect.   Baseline mild nasal obstruction  No recent rhinitis.    Continued on H2 blocker without notable change in symptoms.     Has not pursued SLP yet.         Recall  Ashok Olivera is a 64 y.o. male presenting for chronic cough and throat clearing.     Has been symptomatic for 10-20y and reports making '3-4 different throat clearing noises' in attempts to clear PND, globus ad pharyngeal mucous.      Reports no sore throat, odynophagia.      Notes bothersome chronic congestion and waxing/waning rhinorrhea and PND.   Notes max/midface  pressure  Infrequent acute sinusitis.  Notes allergic rhinitis controlled   with PO antihistamines with limited effect.  Has used topical steroid spray for >3mo without resolution.   Notes bothersome runny nose associated with temperature changes.     Notes prior nasal trauma and known deviated septum.     Reports a history of GERD, takes rabeprazole with some effect.    Intermittent breakthrough at night.         Past Medical History  He has a past medical history of Cough variant asthma ([facility]) (01/14/2022) and Personal history of other diseases of the respiratory system (01/14/2022).    Surgical History  He has a past surgical history that includes MR angio head wo IV contrast (6/12/2019).     Social History  He reports that he has never smoked. He has never used smokeless tobacco. He reports current alcohol use of about 2.0 standard drinks of alcohol per week. He reports that he does not use drugs.    Family History  Family History[1]     Allergies  Prednisone    Review of Systems  ROS: Pertinent positives as noted in HPI.    - CONSTITUTIONAL: Does not report weight loss, fever or chills.    - HEENT:   Ear: Does not report tinnitus, vertigo, hearing loss, otalgia, otorrhea  Nose: Does not report  ,  , epistaxis, decreased smell  Throat: Does not report pain, dysphagia, odynophagia  Larynx: Does not report hoarseness,  difficulty breathing, pain with speaking (odynophonia)  Neck: Does not report new masses, pain, swelling  Face: Does not report sinus pain, pressure, swelling, numbness, weakness     - RESPIRATORY: Does not report SOB or  .    - CV: Does not report palpitations or chest pain.     - GI: Does not report abdominal pain, nausea, vomiting or diarrhea.    - : Does not report dysuria or urinary frequency.    - MSK: Does not report myalgia or joint pain.    - SKIN: Does not report rash or pruritus.    - NEUROLOGICAL: Does not report headache or syncope.    - PSYCHIATRIC: Does not report recent changes  in mood. Does not report anxiety or depression.         Physical Exam:     GENERAL:   Alert & Oriented to person, place and time; Normal affect and appearance. Well developed and well nourished. Conversant & cooperative with examination.     HEAD:   Normocephalic, atraumatic. No sinus tenderness to palpation. Normal parotid bilaterally. Normal facial strength.     NEUROLOGIC:   Cranial nerves II-XII grossly intact, gait WNL. Normal mood and affect.    EYES:   Extraocular movements intact. Pupils equal, round, reactive to light and accommodation. No nystagmus, no ptosis. no scleral injection.    EAR:   Normal auricle. No discomfort or TTP with manipulation.   Handheld otoscopic exam showed normal external auditory canals bilaterally. No purulence or EAC inflammation. Minimal cerumen.   Right tympanic membrane clear and mobile without evidence of perforation, retraction or middle ear effusion.   Left tympanic membrane clear and mobile without evidence of perforation, retraction or middle ear effusion.     NOSE:   rightward external deformity. No external nasal lesions, lacerations, or scars. Nasal tip symmetrical with normal nasal valves.   Nasal cavity with  broad anterior leftward   septum, normal mucosa and turbinates. No lesions, masses, purulence or polyps.     OC/OP:   Mucous membranes moist, no masses, lesions or exudates.   Normal tongue, floor of mouth, teeth, gums, lips. Normal posterior pharyngeal wall.    Normal tonsils without erythema, exudate or obvious calculi     NECK:   No neck masses or thyroid enlargement. Trachea midline. No tenderness to palpation    LYMPHATIC:   No cervical lymphadenopathy.     RESPIRATORY:   Symmetric chest elevation & no retractions. No significant hoarseness. No increased work of breathing.    CV:   No clubbing or cyanosis. No obvious edema    Skin:   No facial rashes, vesicles or lesions.     Extremities:   No gross abnormalities      Clinic Procedure        Information  review:  External sources (notes, imaging, lab results) listed below personally reviewed to aid in medical decision making process.  -CT sinus 5/22/25  -  -         [1]   Family History  Problem Relation Name Age of Onset    Atrial fibrillation Mother MACKENZIELONA PRICEHEMANT     Other (Myocardial Infarction) Mother MACKENZIE ALBERTHEMANT     Arthritis Mother MACKENZIE ALBERTHEMANT     Cancer Mother MACKENZIE ALBERTHEMANT     Heart disease Mother MACKENZIE ALBERTHEMANT     Hypertension Mother MACKENZIE ISSA

## 2025-07-02 ENCOUNTER — EVALUATION (OUTPATIENT)
Dept: SPEECH THERAPY | Facility: CLINIC | Age: 65
End: 2025-07-02
Payer: COMMERCIAL

## 2025-07-02 DIAGNOSIS — R05.3 CHRONIC COUGH: ICD-10-CM

## 2025-07-02 PROCEDURE — 92524 BEHAVRAL QUALIT ANALYS VOICE: CPT | Mod: GN

## 2025-07-02 PROCEDURE — 92507 TX SP LANG VOICE COMM INDIV: CPT | Mod: GN

## 2025-07-02 ASSESSMENT — ENCOUNTER SYMPTOMS
LOSS OF SENSATION IN FEET: 0
OCCASIONAL FEELINGS OF UNSTEADINESS: 0
DEPRESSION: 0

## 2025-07-02 ASSESSMENT — PAIN - FUNCTIONAL ASSESSMENT: PAIN_FUNCTIONAL_ASSESSMENT: 0-10

## 2025-07-02 ASSESSMENT — PAIN SCALES - GENERAL: PAINLEVEL_OUTOF10: 0 - NO PAIN

## 2025-07-02 NOTE — PROGRESS NOTES
Speech-Language Pathology    Voice Evaluation    Patient Name: Ashok Olivera  MRN: [id number]  Today's Date: 2025  Time Calculation  Start Time: 0800  Stop Time: 0900  Time Calculation (min): 60 min      Current Problem:     Codes      Chronic cough     R05.3        Voice Assessment:  Assessment:    This date, patient presented with chronic cough/throat clear and vocal hoarseness. Skilled speech therapy is medically necessary and ordered by a physician in order to provide training/instruction on vocal hygiene/wellness strategies and cough/throat clear reduction techniques. Without skilled speech therapy, patient is at risk for persistence of deficits with impact on his vocal health and quality of life. Patient was in agreement with recommendations and goals/POC.    Treatment Performed: Yes, therapist provided instruction on the following:  Vocal hygiene/wellness strategies to reduce cough/throat clear triggers and phono trauma  Safe removal of mucus using seltzer or warm water gargle techniques  Cough/throat clear reduction techniques (effortful swallow, RTB, Jonna, silent cough/clear, yawn-sigh)  Reflux lifestyle modifications          Voice Plan of Care:  Frequency: 1x/2-3 weeks  Duration: 4-6 visits  Prognosis: Good  Factors affecting prognosis: (+) Patient engagement  Discussed Plan of Care: Patient, Discussed risks/benefits with patient/caregiver, Patient/caregiver agreeable with Plan of Care    GOALS:  By discharge:  LT. Patient will demonstrate reduction in cough/throat clear symptoms as evidenced by improved scores on CSI and RSI following treatment.    STGs:   1. Patient will increase vocal wellness and decrease phonotrauma in adherence with therapist prescribed vocal hygiene and wellness program per patient self-report.  2. Patient will independently execute strategies for cough reduction 90% of the time based on therapeutic observation and patient self-report.  3. Patient will report  "improvement of deficits as measured by a decrease in score on a patient-rated outcome measure (baseline: CSI: 18/40, RSI: 20/45).         General Visit Information:  Reason for Referral:    Patient was referred for outpatient Speech Therapy evaluation 2/2 h/o chronic cough. He arrived to and attended the assessment unaccompanied and provided all medical history independently. Patient was referred by ENT, Dr. Lemus, following recent F/U visit on 6/23/2025 with the following results:    \"Assessment/Plan:      No change in chronic cough and throat clearing.  No obvious allergic rhinitis/sinusitis, discussed systematic cessation of astelin and montelukast and observation for symptoms.   Consider referral for allergy testing if symptoms recur  Discussed transition to gaviscon and observation for effect.  Consider referral to GI if ongoing symptoms despite medical management.     Referral to speech pathology to discuss habit cough.     Nasal obstruction   associated with external nasal deformity as much as anything else.  Pt reports obstruction is minimally bothersome.  Discussed possible functional rhinoplasty and pt is not interested      Follow up:  -plan for follow up in clinic as needed     All of the above findings, impressions, treatment planning and follow up plans were discussed with the patient who indicated understanding.  the patient was instructed to contact or return to clinic sooner if symptoms/signs persist or worsen despite the above management.\"     Today, patient reported the following:    HISTORY: Chronic cough/throat clear for 40 years with no significant improvement/decline; mildly asthmatic with allergies and a h/o LPR; deviated septum surgery in his 20s did not alleviate s/s; recent change in acid reflux medication did not yield appreciable results; occasionally notices breakthrough reflux s/s (e.g. heartburn); frequent sensation of needing to cough/throat clear but also recognized there have " been episodes of behavioral coughing/throat clearing as a means of preventing the occurrence when undesirable (e.g. prior to a work meeting to prevent it from occurring during the meeting); occurs more often when he first lays down to go to sleep at night and occurs less often when he first wakes up in the morning; trialed use of nasal strips at night with no noted improvement in s/s; denied general issues with his voice but indicated he will lose his voice after a day of cutting his grass    Referred By: Dr. David Lemus  Past Medical History Relevant to Rehab: GERD, cough variant asthma, chronic sinus issues  Number of Authorized Treatments : 20 visits/year ([employer]; no auth)  Total Number of Visits : 1        Objective     Pain Assessment:  Pain Assessment: 0-10  Pain Score: 0 - No pain      Voice Use Inventory:  Voice misuse/abuse: Throat clearing/coughing frequently; no other abuse noted  Exposure to Noise: No  Exposure to respiratory irritants: Environmental allergies; otherwise no  Consistent use of singing voice: No  Occupation relies on speaking voice: No      Patient Self Assessment:  Daily water intake: 1 glass/day  Daily caffeine intake: 1 cup/day  Alcohol intake: 1-2 drinks/week  Smoking history: No  Reflux history: Yes, LPR      Cough Severity Index (CSI)  *TOTAL: 18/40      Reflux Severity Index (RSI-10)  *TOTAL: 20/45      Oral Mechanism Exam / Cranial Nerve Exam:  Trigeminal Nerve (V)     Jaw ROM: WFL  Facial Nerve (VII)     Asymmetry at rest: WFL     Lip pucker: WFL     Smile: WFL     Lip/smile alternation: WFL  Glossopharyngeal, Vagus (IX, X)     Uvula at rest: WFL     Palate at rest: WFL     Palatal elevation /a/: WFL  Hypoglossal (XII)     Tongue at rest: WFL     Tongue protrusion: WFL     Side to side: WFL     Tongue elevation: WFL      GRBAS:  Voice quality based on the GRBAS scale: 0=absent; 1=mild; 2=moderate; 3=severe  > Grade: 1  > Roughness: 1  > Breathiness: 0  > Asthenia: 0  > Strain:  1      Motor Speech Production: WFL  Pitch: WFL  Fluency: WFL  Prosody: WFL  Resonance: WFL  Loudness: WFL        OP Education:  Individual(s) Educated: Patient  Written Education : Vocal hygiene/wellness strategies; cough/throat clear reduction techniques; safe mucus removal techniques; reflux lifestyle modifications   Verbal Education : Vocal hygiene/wellness strategies; cough/throat clear reduction techniques; safe mucus removal techniques; reflux lifestyle modifications   Risk and Benefits Discussed with Patient/Caregiver/Other: yes  Patient/Caregiver Demonstrated Understanding: yes  Plan of Care Discussed and Agreed Upon: yes  Patient Response to Education: Patient/Caregiver Verbalized Understanding of Information, Patient/Caregiver Asked Appropriate Questions

## 2025-07-15 ENCOUNTER — TREATMENT (OUTPATIENT)
Dept: SPEECH THERAPY | Facility: CLINIC | Age: 65
End: 2025-07-15
Payer: COMMERCIAL

## 2025-07-15 DIAGNOSIS — R05.3 CHRONIC COUGH: ICD-10-CM

## 2025-07-15 PROCEDURE — 92507 TX SP LANG VOICE COMM INDIV: CPT | Mod: GN

## 2025-07-15 ASSESSMENT — PAIN SCALES - GENERAL: PAINLEVEL_OUTOF10: 0 - NO PAIN

## 2025-07-15 ASSESSMENT — PAIN - FUNCTIONAL ASSESSMENT: PAIN_FUNCTIONAL_ASSESSMENT: 0-10

## 2025-07-15 NOTE — PROGRESS NOTES
Speech-Language Pathology    SLP Adult Outpatient Speech-Language Pathology Treatment     Patient Name: Ashok Olivera  MRN: [id number]  Today's Date: 7/15/2025  Time Calculation  Start Time: 0800  Stop Time: 0845  Time Calculation (min): 45 min      Current Problems:     Codes      Chronic cough     R05.3        SLP Assessment:  SLP TX Intervention Outcome: Making Progress Towards Goals  Treatment Provided: Yes, see Objective for details  Treatment Tolerance: Patient tolerated treatment well  Session Impressions: Patient with good application of vocal hygiene/wellness strategies, reporting that he has reduced caffeine and increased water intake to 64 oz/day. He continues to have episodes of coughing/throat clearing, finding the cough suppression swallow to be less effective and focusing now on sniff-exhale. Therapist encouraged patient to utilize techniques in tandem as well as vocal hygiene strategies to eliminate cough/throat clear.      Plan:  SLP TX Plan: Continue with POC  SLP Frequency: 1x/2-3 weeks  Discussed POC: Patient  Discussed Risks/Benefits: Yes, Patient  Patient/Caregiver Agreeable: Yes  Next Session Plan: Review and practice cough/throat clear reduction techniques as needed  HEP: Vocal hygiene/wellness strategies; cough/throat clear reduction techniques; safe mucus removal techniques; reflux lifestyle modifications       Subjective   Patient arrived to and attended treatment independently.       General Visit Information:  Number of Authorized Treatments : 20 visits/year ([employer]; no auth)  Total Number of Visits :       Pain Assessment:  Pain Assessment: 0-10  Pain Score: 0 - No pain      Objective     GOALS:   By discharge:  LT. Patient will demonstrate reduction in cough/throat clear symptoms as evidenced by improved scores on CSI and RSI following treatment.  > GOAL STATUS: Ongoing; progressing      STGs:   1. Patient will increase vocal wellness and decrease phonotrauma in adherence  with therapist prescribed vocal hygiene and wellness program per patient self-report.  > ADDRESSED: Patient reported having made efforts toward improved vocal hygiene/wellness since his initial evaluation. He has focused on increased hydration (2, 32 oz water bottles/day), reducing caffeine intake, and using sugar free hard candy for lubrication.  > GOAL STATUS: Ongoing; progressing    2. Patient will independently execute strategies for cough reduction 90% of the time based on therapeutic observation and patient self-report.  > ADDRESSED: Patient demo'd the ability to perform cough/throat clear suppression techniques with 100% accuracy during today's review. He demo'd no spontaneous episodes of cough/throat clear during the session, only occurring when patient was demo'ing what these occurrences typically sound like. Patient indicated that he tried utilizing strategies at home, finding that the cough suppression swallow was effective until he stopped and the urge to cough/throat clear immediately returned. He noticed that urge to cough/throat clear emerges most when he is stationary but can also occur during activity (e.g. on a walk, etc.). Patient has been focusing on sniff-exhale method over the last few days. Therapist encouraged patient to utilize several strategies to quell the same episode and to utilize vocal hygiene techniques (e.g. drinking water) to suppress cough/throat clear as well.  > GOAL STATUS: Ongoing; progressing    3. Patient will report improvement of deficits as measured by a decrease in score on a patient-rated outcome measure (baseline: CSI: 18/40, RSI: 20/45).   > NOT ADDRESSED.  > GOAL STATUS: Ongoing      Outpatient Education:  Individual(s) Educated: Patient  Verbal Education : Review of vocal hygiene/wellness and cough/throat clear suppression techniques  Risk and Benefits Discussed with Patient/Caregiver/Other: yes  Patient/Caregiver Demonstrated Understanding: yes  Plan of Care  Discussed and Agreed Upon: yes  Patient Response to Education: Patient/Caregiver Verbalized Understanding of Information, Patient/Caregiver Asked Appropriate Questions

## 2025-07-31 ENCOUNTER — APPOINTMENT (OUTPATIENT)
Dept: OTOLARYNGOLOGY | Facility: CLINIC | Age: 65
End: 2025-07-31
Payer: COMMERCIAL

## 2025-08-15 ENCOUNTER — TREATMENT (OUTPATIENT)
Dept: SPEECH THERAPY | Facility: CLINIC | Age: 65
End: 2025-08-15
Payer: COMMERCIAL

## 2025-08-15 DIAGNOSIS — R05.3 CHRONIC COUGH: ICD-10-CM

## 2025-08-15 PROCEDURE — 92507 TX SP LANG VOICE COMM INDIV: CPT | Mod: GN

## 2025-08-15 ASSESSMENT — PAIN - FUNCTIONAL ASSESSMENT: PAIN_FUNCTIONAL_ASSESSMENT: 0-10

## 2025-08-15 ASSESSMENT — PAIN SCALES - GENERAL: PAINLEVEL_OUTOF10: 0 - NO PAIN

## 2025-08-24 DIAGNOSIS — J31.0 CHRONIC RHINITIS: ICD-10-CM

## 2025-08-25 RX ORDER — AZELASTINE 1 MG/ML
2 SPRAY, METERED NASAL 2 TIMES DAILY
Qty: 30 ML | Refills: 11 | Status: SHIPPED | OUTPATIENT
Start: 2025-08-25